# Patient Record
Sex: FEMALE | Race: WHITE
[De-identification: names, ages, dates, MRNs, and addresses within clinical notes are randomized per-mention and may not be internally consistent; named-entity substitution may affect disease eponyms.]

---

## 2020-11-10 ENCOUNTER — TRANSCRIPTION ENCOUNTER (OUTPATIENT)
Age: 30
End: 2020-11-10

## 2021-03-09 ENCOUNTER — TRANSCRIPTION ENCOUNTER (OUTPATIENT)
Age: 31
End: 2021-03-09

## 2021-04-15 ENCOUNTER — TRANSCRIPTION ENCOUNTER (OUTPATIENT)
Age: 31
End: 2021-04-15

## 2021-05-19 ENCOUNTER — TRANSCRIPTION ENCOUNTER (OUTPATIENT)
Age: 31
End: 2021-05-19

## 2021-08-16 ENCOUNTER — APPOINTMENT (OUTPATIENT)
Dept: PLASTIC SURGERY | Facility: CLINIC | Age: 31
End: 2021-08-16
Payer: SELF-PAY

## 2021-08-16 PROCEDURE — 99024 POSTOP FOLLOW-UP VISIT: CPT

## 2021-10-14 ENCOUNTER — TRANSCRIPTION ENCOUNTER (OUTPATIENT)
Age: 31
End: 2021-10-14

## 2021-11-30 NOTE — PHYSICAL EXAM
[NI] : Normal [de-identified] : Normal anatomy to upper and lower lip.  [de-identified] : Skin clear, no lesions, no cellulitis.

## 2021-11-30 NOTE — ASSESSMENT
[FreeTextEntry1] : Patient is a 30 y/o F, otherwise healthy presenting to clinic for improvement in lip size and contour. She desires a mckeon natural appearing top and lower lip. Discussed risks/benefits of filler injections with patient including pain, soreness at injection site, local reaction, asymmetry, and swelling. Patient understanding and consent for filler injection obtained. \par \par \par - Patient was given bilateral infraorbital and mental nerve block with 5cc 1% lidocaine with epi. \par - Area cleansed with alcohol prep \par - 1 and a half syringes of 0.55ml of Volbella XC administered to superior and inferior lips, tolerated well \par - Ice pack applied \par \par LOT: [id number]\par EXP: 2021-08-27\par \par Explained to patient potential for increased swelling and ecchymosis over next several days, apply ice pack, head elevation and over the counter pain medicine for soreness. Patient may contact clinic with any questions or concerns.

## 2021-11-30 NOTE — HISTORY OF PRESENT ILLNESS
[FreeTextEntry1] : Patient is a 32 y/o otherwise healthy female with no past medical history presenting to clinic requesting mckeon lips. She wound like to have filler to both her top and bottom lips. She has never had injections previously. Denies any medical history of history of allergies.

## 2021-12-01 ENCOUNTER — TRANSCRIPTION ENCOUNTER (OUTPATIENT)
Age: 31
End: 2021-12-01

## 2025-04-20 ENCOUNTER — INPATIENT (INPATIENT)
Facility: HOSPITAL | Age: 35
LOS: 0 days | Discharge: LEFT BEFORE DISCHARGE | DRG: 316 | End: 2025-04-21
Attending: INTERNAL MEDICINE | Admitting: INTERNAL MEDICINE
Payer: COMMERCIAL

## 2025-04-20 VITALS
DIASTOLIC BLOOD PRESSURE: 49 MMHG | OXYGEN SATURATION: 99 % | RESPIRATION RATE: 16 BRPM | WEIGHT: 220.02 LBS | SYSTOLIC BLOOD PRESSURE: 80 MMHG | HEIGHT: 62 IN | HEART RATE: 79 BPM | TEMPERATURE: 98 F

## 2025-04-20 DIAGNOSIS — I95.9 HYPOTENSION, UNSPECIFIED: ICD-10-CM

## 2025-04-20 LAB
ALBUMIN SERPL ELPH-MCNC: 2.9 G/DL — LOW (ref 3.5–5)
ALP SERPL-CCNC: 86 U/L — SIGNIFICANT CHANGE UP (ref 40–120)
ALT FLD-CCNC: 12 U/L DA — SIGNIFICANT CHANGE UP (ref 10–60)
ANION GAP SERPL CALC-SCNC: 3 MMOL/L — LOW (ref 5–17)
AST SERPL-CCNC: 14 U/L — SIGNIFICANT CHANGE UP (ref 10–40)
BASOPHILS # BLD AUTO: 0.03 K/UL — SIGNIFICANT CHANGE UP (ref 0–0.2)
BASOPHILS NFR BLD AUTO: 0.6 % — SIGNIFICANT CHANGE UP (ref 0–2)
BILIRUB SERPL-MCNC: 0.1 MG/DL — LOW (ref 0.2–1.2)
BLD GP AB SCN SERPL QL: SIGNIFICANT CHANGE UP
BUN SERPL-MCNC: 9 MG/DL — SIGNIFICANT CHANGE UP (ref 7–18)
CALCIUM SERPL-MCNC: 8.4 MG/DL — SIGNIFICANT CHANGE UP (ref 8.4–10.5)
CHLORIDE SERPL-SCNC: 110 MMOL/L — HIGH (ref 96–108)
CK SERPL-CCNC: 78 U/L — SIGNIFICANT CHANGE UP (ref 21–215)
CO2 SERPL-SCNC: 28 MMOL/L — SIGNIFICANT CHANGE UP (ref 22–31)
CREAT SERPL-MCNC: 0.68 MG/DL — SIGNIFICANT CHANGE UP (ref 0.5–1.3)
EGFR: 116 ML/MIN/1.73M2 — SIGNIFICANT CHANGE UP
EGFR: 116 ML/MIN/1.73M2 — SIGNIFICANT CHANGE UP
EOSINOPHIL # BLD AUTO: 0.23 K/UL — SIGNIFICANT CHANGE UP (ref 0–0.5)
EOSINOPHIL NFR BLD AUTO: 4.2 % — SIGNIFICANT CHANGE UP (ref 0–6)
GLUCOSE SERPL-MCNC: 102 MG/DL — HIGH (ref 70–99)
HCG SERPL-ACNC: <1 MIU/ML — SIGNIFICANT CHANGE UP
HCT VFR BLD CALC: 28.7 % — LOW (ref 34.5–45)
HCT VFR BLD CALC: 28.9 % — LOW (ref 34.5–45)
HGB BLD-MCNC: 9.4 G/DL — LOW (ref 11.5–15.5)
HGB BLD-MCNC: 9.5 G/DL — LOW (ref 11.5–15.5)
IMM GRANULOCYTES NFR BLD AUTO: 0.2 % — SIGNIFICANT CHANGE UP (ref 0–0.9)
LIDOCAIN IGE QN: 66 U/L — SIGNIFICANT CHANGE UP (ref 13–75)
LYMPHOCYTES # BLD AUTO: 1.49 K/UL — SIGNIFICANT CHANGE UP (ref 1–3.3)
LYMPHOCYTES # BLD AUTO: 27.5 % — SIGNIFICANT CHANGE UP (ref 13–44)
MCHC RBC-ENTMCNC: 27.1 PG — SIGNIFICANT CHANGE UP (ref 27–34)
MCHC RBC-ENTMCNC: 27.2 PG — SIGNIFICANT CHANGE UP (ref 27–34)
MCHC RBC-ENTMCNC: 32.5 G/DL — SIGNIFICANT CHANGE UP (ref 32–36)
MCHC RBC-ENTMCNC: 33.1 G/DL — SIGNIFICANT CHANGE UP (ref 32–36)
MCV RBC AUTO: 82.2 FL — SIGNIFICANT CHANGE UP (ref 80–100)
MCV RBC AUTO: 83.3 FL — SIGNIFICANT CHANGE UP (ref 80–100)
MONOCYTES # BLD AUTO: 0.51 K/UL — SIGNIFICANT CHANGE UP (ref 0–0.9)
MONOCYTES NFR BLD AUTO: 9.4 % — SIGNIFICANT CHANGE UP (ref 2–14)
NEUTROPHILS # BLD AUTO: 3.15 K/UL — SIGNIFICANT CHANGE UP (ref 1.8–7.4)
NEUTROPHILS NFR BLD AUTO: 58.1 % — SIGNIFICANT CHANGE UP (ref 43–77)
NRBC BLD AUTO-RTO: 0 /100 WBCS — SIGNIFICANT CHANGE UP (ref 0–0)
NRBC BLD AUTO-RTO: 0 /100 WBCS — SIGNIFICANT CHANGE UP (ref 0–0)
PLATELET # BLD AUTO: 204 K/UL — SIGNIFICANT CHANGE UP (ref 150–400)
PLATELET # BLD AUTO: 213 K/UL — SIGNIFICANT CHANGE UP (ref 150–400)
POTASSIUM SERPL-MCNC: 3.8 MMOL/L — SIGNIFICANT CHANGE UP (ref 3.5–5.3)
POTASSIUM SERPL-SCNC: 3.8 MMOL/L — SIGNIFICANT CHANGE UP (ref 3.5–5.3)
PROT SERPL-MCNC: 6 G/DL — SIGNIFICANT CHANGE UP (ref 6–8.3)
RBC # BLD: 3.47 M/UL — LOW (ref 3.8–5.2)
RBC # BLD: 3.49 M/UL — LOW (ref 3.8–5.2)
RBC # FLD: 15.6 % — HIGH (ref 10.3–14.5)
RBC # FLD: 15.7 % — HIGH (ref 10.3–14.5)
SODIUM SERPL-SCNC: 141 MMOL/L — SIGNIFICANT CHANGE UP (ref 135–145)
TROPONIN I, HIGH SENSITIVITY RESULT: 3.7 NG/L — SIGNIFICANT CHANGE UP
WBC # BLD: 5.42 K/UL — SIGNIFICANT CHANGE UP (ref 3.8–10.5)
WBC # BLD: 6.36 K/UL — SIGNIFICANT CHANGE UP (ref 3.8–10.5)
WBC # FLD AUTO: 5.42 K/UL — SIGNIFICANT CHANGE UP (ref 3.8–10.5)
WBC # FLD AUTO: 6.36 K/UL — SIGNIFICANT CHANGE UP (ref 3.8–10.5)

## 2025-04-20 PROCEDURE — 93010 ELECTROCARDIOGRAM REPORT: CPT

## 2025-04-20 PROCEDURE — 99291 CRITICAL CARE FIRST HOUR: CPT

## 2025-04-20 RX ADMIN — Medication 1000 MILLILITER(S): at 21:10

## 2025-04-20 RX ADMIN — Medication 100 MILLILITER(S): at 23:02

## 2025-04-20 RX ADMIN — Medication 1000 MILLILITER(S): at 18:10

## 2025-04-20 RX ADMIN — Medication 2000 MILLILITER(S): at 14:39

## 2025-04-20 RX ADMIN — Medication 2000 MILLILITER(S): at 16:00

## 2025-04-20 NOTE — ED ADULT NURSE REASSESSMENT NOTE - NS ED NURSE REASSESS COMMENT FT1
Pt reports not feeling well. Noted to be joe cardiac on monitor. Pt placed in Trendelenburg position. MD Reyes made aware.

## 2025-04-20 NOTE — ED ADULT NURSE NOTE - OBJECTIVE STATEMENT
Assumed pt care for a 35 yr old female complaining of a near syncopal event. Pt was a visitor at the hospital and reports not feeing well while visiting a patient. Rapid was called pt transported to ED. Pt denies any fever chills, chest pain, dyspnea, N/V/D. Pt noted to be pale and teresa with clammy skin. MD made aware. Reports heavy menstrual cycle this month denies pregnancy.  IV established. Labs collected. meds given as per order. Pt placed on cardiac monitor.

## 2025-04-20 NOTE — ED PROVIDER NOTE - OBJECTIVE STATEMENT
35-year-old female with history of uterine fibroid for embolization on 5/2, depression/anxiety, ADHD, anemia on iron every day.  Patient with menstruation 2 days ago, uses ultra tampon, change 2 per hour with clots.  Complaining of feeling lightheaded and increased fatigue since this morning.  She came to visit her grandmother in ICU.  While sitting on the chair, suddenly felt lightheaded, sweaty, had a near syncope, rapid response was called and for patient to ED.  Patient denies vomiting, CP, palpitation.  She had similar episode 2 years ago.  Patient never had blood transfusion

## 2025-04-20 NOTE — ED ADULT TRIAGE NOTE - CHIEF COMPLAINT QUOTE
dizziness and almost passed out tody while visiting family member in the hospital, Slid of the chair on the floor, states having heavy menstruation on her 3rd day. Denies any dizziness now.

## 2025-04-20 NOTE — ED PROVIDER NOTE - PROGRESS NOTE DETAILS
Labs/EKG explained to both patient and mother   patient with orthostatic hypotension despite receiving 2 L of normal saline   repeat CBC, no changes with hemoglobin. RN noted patient's heart rate dropped to 36 upon standing up    Concern for autonomic dysfunction, Unlikely sepsis, patient with no fever, leukocytosis,  will admit patient   Case discussed with Dr. Denise

## 2025-04-20 NOTE — ED PROVIDER NOTE - CARE PLAN
Principal Discharge DX:	Hypotension   1 Principal Discharge DX:	Hypotension  Secondary Diagnosis:	Autonomic dysfunction

## 2025-04-20 NOTE — ED PROVIDER NOTE - EKG #1 DATE/TIME
HYDROcodone-acetaminophen (Norco)  MG per tablet 45 tablet 0 12/15/2023 --    Sig: Take 1 to 2 tablets daily x 30 days qty 45.    Sent to pharmacy as: HYDROcodone-Acetaminophen  MG Oral Tablet    Class: Eprescribe      [facility] - [place], [place] - [address] [place] [address] [[id number]]   
Refill request [facility]  #[phone]  
20-Apr-2025 14:07

## 2025-04-21 ENCOUNTER — RESULT REVIEW (OUTPATIENT)
Age: 35
End: 2025-04-21

## 2025-04-21 ENCOUNTER — TRANSCRIPTION ENCOUNTER (OUTPATIENT)
Age: 35
End: 2025-04-21

## 2025-04-21 VITALS — OXYGEN SATURATION: 98 % | HEART RATE: 72 BPM | TEMPERATURE: 98 F | RESPIRATION RATE: 18 BRPM

## 2025-04-21 DIAGNOSIS — D25.9 LEIOMYOMA OF UTERUS, UNSPECIFIED: ICD-10-CM

## 2025-04-21 DIAGNOSIS — D50.9 IRON DEFICIENCY ANEMIA, UNSPECIFIED: ICD-10-CM

## 2025-04-21 DIAGNOSIS — Z29.9 ENCOUNTER FOR PROPHYLACTIC MEASURES, UNSPECIFIED: ICD-10-CM

## 2025-04-21 DIAGNOSIS — R55 SYNCOPE AND COLLAPSE: ICD-10-CM

## 2025-04-21 DIAGNOSIS — F41.8 OTHER SPECIFIED ANXIETY DISORDERS: ICD-10-CM

## 2025-04-21 LAB
ALBUMIN SERPL ELPH-MCNC: 2.7 G/DL — LOW (ref 3.5–5)
ALP SERPL-CCNC: 72 U/L — SIGNIFICANT CHANGE UP (ref 40–120)
ALT FLD-CCNC: 12 U/L DA — SIGNIFICANT CHANGE UP (ref 10–60)
ANION GAP SERPL CALC-SCNC: 6 MMOL/L — SIGNIFICANT CHANGE UP (ref 5–17)
AST SERPL-CCNC: 18 U/L — SIGNIFICANT CHANGE UP (ref 10–40)
BASOPHILS # BLD AUTO: 0.03 K/UL — SIGNIFICANT CHANGE UP (ref 0–0.2)
BASOPHILS NFR BLD AUTO: 0.5 % — SIGNIFICANT CHANGE UP (ref 0–2)
BILIRUB SERPL-MCNC: 0.3 MG/DL — SIGNIFICANT CHANGE UP (ref 0.2–1.2)
BUN SERPL-MCNC: 9 MG/DL — SIGNIFICANT CHANGE UP (ref 7–18)
CALCIUM SERPL-MCNC: 8.6 MG/DL — SIGNIFICANT CHANGE UP (ref 8.4–10.5)
CHLORIDE SERPL-SCNC: 110 MMOL/L — HIGH (ref 96–108)
CHOLEST SERPL-MCNC: 202 MG/DL — HIGH
CO2 SERPL-SCNC: 25 MMOL/L — SIGNIFICANT CHANGE UP (ref 22–31)
CREAT SERPL-MCNC: 0.52 MG/DL — SIGNIFICANT CHANGE UP (ref 0.5–1.3)
EGFR: 123 ML/MIN/1.73M2 — SIGNIFICANT CHANGE UP
EGFR: 123 ML/MIN/1.73M2 — SIGNIFICANT CHANGE UP
EOSINOPHIL # BLD AUTO: 0.26 K/UL — SIGNIFICANT CHANGE UP (ref 0–0.5)
EOSINOPHIL NFR BLD AUTO: 3.9 % — SIGNIFICANT CHANGE UP (ref 0–6)
GLUCOSE SERPL-MCNC: 98 MG/DL — SIGNIFICANT CHANGE UP (ref 70–99)
HCT VFR BLD CALC: 29.2 % — LOW (ref 34.5–45)
HDLC SERPL-MCNC: 62 MG/DL — SIGNIFICANT CHANGE UP
HGB BLD-MCNC: 9.7 G/DL — LOW (ref 11.5–15.5)
IMM GRANULOCYTES NFR BLD AUTO: 0.5 % — SIGNIFICANT CHANGE UP (ref 0–0.9)
LDLC SERPL-MCNC: 130 MG/DL — HIGH
LIPID PNL WITH DIRECT LDL SERPL: 130 MG/DL — HIGH
LYMPHOCYTES # BLD AUTO: 1.55 K/UL — SIGNIFICANT CHANGE UP (ref 1–3.3)
LYMPHOCYTES # BLD AUTO: 23.4 % — SIGNIFICANT CHANGE UP (ref 13–44)
MAGNESIUM SERPL-MCNC: 2 MG/DL — SIGNIFICANT CHANGE UP (ref 1.6–2.6)
MCHC RBC-ENTMCNC: 27.2 PG — SIGNIFICANT CHANGE UP (ref 27–34)
MCHC RBC-ENTMCNC: 33.2 G/DL — SIGNIFICANT CHANGE UP (ref 32–36)
MCV RBC AUTO: 81.8 FL — SIGNIFICANT CHANGE UP (ref 80–100)
MONOCYTES # BLD AUTO: 0.44 K/UL — SIGNIFICANT CHANGE UP (ref 0–0.9)
MONOCYTES NFR BLD AUTO: 6.6 % — SIGNIFICANT CHANGE UP (ref 2–14)
NEUTROPHILS # BLD AUTO: 4.31 K/UL — SIGNIFICANT CHANGE UP (ref 1.8–7.4)
NEUTROPHILS NFR BLD AUTO: 65.1 % — SIGNIFICANT CHANGE UP (ref 43–77)
NONHDLC SERPL-MCNC: 140 MG/DL — HIGH
NRBC BLD AUTO-RTO: 0 /100 WBCS — SIGNIFICANT CHANGE UP (ref 0–0)
PHOSPHATE SERPL-MCNC: 3.2 MG/DL — SIGNIFICANT CHANGE UP (ref 2.5–4.5)
PLATELET # BLD AUTO: 210 K/UL — SIGNIFICANT CHANGE UP (ref 150–400)
POTASSIUM SERPL-MCNC: 3.8 MMOL/L — SIGNIFICANT CHANGE UP (ref 3.5–5.3)
POTASSIUM SERPL-SCNC: 3.8 MMOL/L — SIGNIFICANT CHANGE UP (ref 3.5–5.3)
PROT SERPL-MCNC: 5.9 G/DL — LOW (ref 6–8.3)
RBC # BLD: 3.57 M/UL — LOW (ref 3.8–5.2)
RBC # FLD: 15.6 % — HIGH (ref 10.3–14.5)
SODIUM SERPL-SCNC: 141 MMOL/L — SIGNIFICANT CHANGE UP (ref 135–145)
TRIGL SERPL-MCNC: 56 MG/DL — SIGNIFICANT CHANGE UP
TSH SERPL-MCNC: 2.16 UU/ML — SIGNIFICANT CHANGE UP (ref 0.34–4.82)
WBC # BLD: 6.62 K/UL — SIGNIFICANT CHANGE UP (ref 3.8–10.5)
WBC # FLD AUTO: 6.62 K/UL — SIGNIFICANT CHANGE UP (ref 3.8–10.5)

## 2025-04-21 PROCEDURE — 84702 CHORIONIC GONADOTROPIN TEST: CPT

## 2025-04-21 PROCEDURE — 84100 ASSAY OF PHOSPHORUS: CPT

## 2025-04-21 PROCEDURE — 93306 TTE W/DOPPLER COMPLETE: CPT | Mod: 26

## 2025-04-21 PROCEDURE — 80061 LIPID PANEL: CPT

## 2025-04-21 PROCEDURE — 84484 ASSAY OF TROPONIN QUANT: CPT

## 2025-04-21 PROCEDURE — 82550 ASSAY OF CK (CPK): CPT

## 2025-04-21 PROCEDURE — 83690 ASSAY OF LIPASE: CPT

## 2025-04-21 PROCEDURE — 85025 COMPLETE CBC W/AUTO DIFF WBC: CPT

## 2025-04-21 PROCEDURE — 96361 HYDRATE IV INFUSION ADD-ON: CPT

## 2025-04-21 PROCEDURE — 84443 ASSAY THYROID STIM HORMONE: CPT

## 2025-04-21 PROCEDURE — 93306 TTE W/DOPPLER COMPLETE: CPT

## 2025-04-21 PROCEDURE — 99291 CRITICAL CARE FIRST HOUR: CPT

## 2025-04-21 PROCEDURE — 93005 ELECTROCARDIOGRAM TRACING: CPT

## 2025-04-21 PROCEDURE — 82962 GLUCOSE BLOOD TEST: CPT

## 2025-04-21 PROCEDURE — 96360 HYDRATION IV INFUSION INIT: CPT

## 2025-04-21 PROCEDURE — 86901 BLOOD TYPING SEROLOGIC RH(D): CPT

## 2025-04-21 PROCEDURE — 80053 COMPREHEN METABOLIC PANEL: CPT

## 2025-04-21 PROCEDURE — 85027 COMPLETE CBC AUTOMATED: CPT

## 2025-04-21 PROCEDURE — 86900 BLOOD TYPING SEROLOGIC ABO: CPT

## 2025-04-21 PROCEDURE — 71045 X-RAY EXAM CHEST 1 VIEW: CPT

## 2025-04-21 PROCEDURE — 36415 COLL VENOUS BLD VENIPUNCTURE: CPT

## 2025-04-21 PROCEDURE — 86850 RBC ANTIBODY SCREEN: CPT

## 2025-04-21 PROCEDURE — 99222 1ST HOSP IP/OBS MODERATE 55: CPT

## 2025-04-21 PROCEDURE — 83036 HEMOGLOBIN GLYCOSYLATED A1C: CPT

## 2025-04-21 PROCEDURE — 83735 ASSAY OF MAGNESIUM: CPT

## 2025-04-21 RX ORDER — ATOMOXETINE 10 MG/1
1 CAPSULE ORAL
Refills: 0 | DISCHARGE

## 2025-04-21 RX ORDER — SERTRALINE 100 MG/1
100 TABLET, FILM COATED ORAL DAILY
Refills: 0 | Status: DISCONTINUED | OUTPATIENT
Start: 2025-04-21 | End: 2025-04-21

## 2025-04-21 RX ORDER — ENOXAPARIN SODIUM 100 MG/ML
40 INJECTION SUBCUTANEOUS EVERY 24 HOURS
Refills: 0 | Status: DISCONTINUED | OUTPATIENT
Start: 2025-04-21 | End: 2025-04-21

## 2025-04-21 RX ORDER — FERROUS SULFATE 137(45) MG
325 TABLET, EXTENDED RELEASE ORAL DAILY
Refills: 0 | Status: DISCONTINUED | OUTPATIENT
Start: 2025-04-21 | End: 2025-04-21

## 2025-04-21 RX ORDER — SERTRALINE 100 MG/1
150 TABLET, FILM COATED ORAL DAILY
Refills: 0 | Status: DISCONTINUED | OUTPATIENT
Start: 2025-04-21 | End: 2025-04-21

## 2025-04-21 RX ORDER — SERTRALINE 100 MG/1
1.5 TABLET, FILM COATED ORAL
Refills: 0 | DISCHARGE

## 2025-04-21 RX ORDER — SPIRONOLACTONE 25 MG
1 TABLET ORAL
Refills: 0 | DISCHARGE

## 2025-04-21 RX ORDER — FERROUS SULFATE 137(45) MG
1 TABLET, EXTENDED RELEASE ORAL
Refills: 0 | DISCHARGE

## 2025-04-21 RX ADMIN — Medication 100 MILLILITER(S): at 01:37

## 2025-04-21 RX ADMIN — ENOXAPARIN SODIUM 40 MILLIGRAM(S): 100 INJECTION SUBCUTANEOUS at 06:41

## 2025-04-21 NOTE — DISCHARGE NOTE PROVIDER - NSDCCAREPROVSEEN_GEN_ALL_CORE_FT
Miah, Danie Cui, Molly Durham, Vern Flores, Alison Laws, Sabina Mckeon, Berkley Bland, Maribel Jasso, Dimple Denise, Ramon Wang, Mac Taylor

## 2025-04-21 NOTE — DISCHARGE NOTE PROVIDER - NSDCCPCAREPLAN_GEN_ALL_CORE_FT
PRINCIPAL DISCHARGE DIAGNOSIS  Diagnosis: Hypotension  Assessment and Plan of Treatment: You came to the hospital after passing out. Your blood pressure was low with systolic in the 80s, HR low at 36 BPM on admission. Your EKG was normal. Your orthostatic vitals were positive ; you were given fluids with improvement. Heart echo (ultrasound) showed _____. Chest X ray normal. Neurology consulted; you elected on to have CT scan. Your syncope episode was multifactorial including orthostatic hypotension, mild anemia, and bradycardia. Please follow up with neurology and cardiology within 1 week.      SECONDARY DISCHARGE DIAGNOSES  Diagnosis: Uterine fibroid  Assessment and Plan of Treatment: You have history of uterine fibroids with menorrhagia due for uterine embolization on 5/2. Your Hgb 9.4 on admission. Please continue your iron tablet for iron deficiency anemia. Please follow up with your PCP within 1 week.    Diagnosis: Iron deficiency anemia  Assessment and Plan of Treatment: Your Hgb 9.4 on admission. Please continue your iron tablet for iron deficiency anemia. Please follow up with your PCP within 1 week.    Diagnosis: Anxiety with depression  Assessment and Plan of Treatment: Please continue your same home medication sertraline.    Diagnosis: ADHD  Assessment and Plan of Treatment: Please continue your same home medication atomexetine.     PRINCIPAL DISCHARGE DIAGNOSIS  Diagnosis: Hypotension  Assessment and Plan of Treatment: You came to the hospital after passing out. Your blood pressure was low with systolic in the 80s, HR low at 36 BPM on admission. Your EKG was normal. Your orthostatic vitals were positive ; you were given fluids with improvement. Heart echo (ultrasound) was performed. Chest X ray normal. Neurology consulted; you elected on to have CT scan. Your syncope episode was multifactorial including orthostatic hypotension, mild anemia, and bradycardia. Please follow up with neurology and cardiology within 1 week.  After long discussion, you requested to leave hospital against medical advice. You understand the risks of mortality and morbidity from leaving against medical advice. You were explained your current medical condition, risks and consequences of leaving without finishing treatment and workup. You were instructed to return to ED if you do not feel well and that you are welcome to return for care any time.      SECONDARY DISCHARGE DIAGNOSES  Diagnosis: Uterine fibroid  Assessment and Plan of Treatment: You have history of uterine fibroids with menorrhagia due for uterine embolization on 5/2. Your Hgb 9.4 on admission. Please continue your iron tablet for iron deficiency anemia. Please follow up with your PCP within 1 week.    Diagnosis: Iron deficiency anemia  Assessment and Plan of Treatment: Your Hgb 9.4 on admission. Please continue your iron tablet for iron deficiency anemia. Please follow up with your PCP within 1 week.    Diagnosis: Anxiety with depression  Assessment and Plan of Treatment: Please continue your same home medication sertraline.    Diagnosis: ADHD  Assessment and Plan of Treatment: Please continue your same home medication atomexetine.

## 2025-04-21 NOTE — DISCHARGE NOTE NURSING/CASE MANAGEMENT/SOCIAL WORK - PATIENT PORTAL LINK FT
You can access the FollowMyHealth Patient Portal offered by [facility] by registering at the following website: [email or web address]. By joining [facility]’s FollowMyHealth portal, you will also be able to view your health information using other applications (apps) compatible with our system.

## 2025-04-21 NOTE — H&P ADULT - NSHPPHYSICALEXAM_GEN_ALL_CORE
GENERAL: NAD; lying in bed  HEAD:  Atraumatic, Normocephalic  EYES: EOMI, PERRLA, conjunctiva and sclera clear  ENMT: Dry mucous membranes  NECK: Supple, normal appearance,   NERVOUS SYSTEM:  Alert & Oriented X3,    CHEST/LUNG: Lungs clear to auscultation bilaterally, No rales, rhonchi, wheezing   HEART: Regular rate and rhythm; No murmurs, rubs, or gallops  ABDOMEN: Soft, Nontender, Nondistended; Bowel sounds present  EXTREMITIES:  2+ Peripheral Pulses, No edema  SKIN: No rashes or lesions;

## 2025-04-21 NOTE — PROGRESS NOTE ADULT - PROBLEM SELECTOR PLAN 2
h/o uterine fibroids with menorrhagia due for uterine a. embolization on 5/2  unknown baseline Hb, on adm 9.4  continue to monitor   transfuse if <7

## 2025-04-21 NOTE — PROGRESS NOTE ADULT - PROBLEM SELECTOR PLAN 1
presented after episode of dizziness in the ICU, and syncope in the ED in the setting of stressful environment, with prodromal symptoms  in the ED hypotensive to SBP 80s, bradycardic episode to 36  EKG showing NSR 71 BPM, Trop negx1, CXR neg  DBP positive for orthostats on sitting to standing  s/p 3L IVF NS  most likely vasovagal syncope vs orthostatic hypotension  admit to telemetry  c/w IVF 100ml/hr  f/u rpt orthostatic vitals in the AM  f/u TTE  Consult Cardiology and Neurology in the AM CONSTITUTIONAL: denies fever, chills, fatigue, weakness  HEENT: denies blurred visions, sore throat  SKIN: denies new lesions, rash  CARDIOVASCULAR: denies chest pain, chest pressure, palpitations  RESPIRATORY: denies shortness of breath, sputum production  GASTROINTESTINAL: denies nausea, vomiting, diarrhea, abdominal pain  GENITOURINARY: denies dysuria, discharge  NEUROLOGICAL: denies numbness, headache, focal weakness  MUSCULOSKELETAL: denies new joint pain, muscle aches  HEMATOLOGIC: denies gross bleeding, bruising  LYMPHATICS: denies enlarged lymph nodes, extremity swelling  PSYCHIATRIC: denies recent changes in anxiety, depression  ENDOCRINOLOGIC: denies sweating, cold or heat intolerance presented after episode of dizziness in the ICU, and syncope in the ED in the setting of stressful environment, with prodromal symptoms  in the ED hypotensive to SBP 80s, bradycardic episode to 36  EKG showing NSR 71 BPM, Trop negx1, CXR neg  DBP positive for orthostats on sitting to standing  s/p 3L IVF NS  most likely vasovagal syncope vs orthostatic hypotension  admit to telemetry  c/w IVF 100ml/hr  f/u orthostatics repeat  f/u TTE  f/u CTH and CT cervical spine  Cardiology consulted Dr Bartlett  Neuro consulted Dr Dooley CONSTITUTIONAL: admits to fever, chills, fatigue, weakness  HEENT: denies blurred visions, sore throat  SKIN: denies new lesions, rash  CARDIOVASCULAR: admits to pleuritic chest pain, chest pressure, denies palpitations  RESPIRATORY: admits to pleuritic chest pain, shortness of breath, denies sputum production  GASTROINTESTINAL: admits to nausea, diarrhea and decreased PO intake, denies vomiting  GENITOURINARY: denies dysuria, discharge  NEUROLOGICAL: denies numbness, headache, focal weakness  MUSCULOSKELETAL: admits to muscle aches  HEMATOLOGIC: denies gross bleeding, bruising  LYMPHATICS: denies enlarged lymph nodes, extremity swelling  PSYCHIATRIC: denies recent changes in anxiety, depression

## 2025-04-21 NOTE — H&P ADULT - NSHPREVIEWOFSYSTEMS_GEN_ALL_CORE
CONSTITUTIONAL: No fever, weight loss, or fatigue  RESPIRATORY: No cough, wheezing, chills or hemoptysis; No shortness of breath  CARDIOVASCULAR: No chest pain, palpitations, dizziness, or leg swelling  GASTROINTESTINAL: No abdominal pain. No nausea, vomiting, or hematemesis; No diarrhea or constipation. No melena or hematochezia.  GENITOURINARY: No dysuria or hematuria, urinary frequency  NEUROLOGICAL: No headaches, memory loss, loss of strength, numbness, or tremors  ENDOCRINE: No polyuria, polydipsia, or heat/cold intolerance  MUSKULOSKELETAL: No muscle aches, joint pains  HEME: no easy bruisability, no tender or enlarged lymph nodes  SKIN: No itching, burning, rashes, or lesions . CONSTITUTIONAL: No fever, weight loss, or fatigue  RESPIRATORY: No cough, wheezing, chills or hemoptysis; No shortness of breath  CARDIOVASCULAR: No chest pain, palpitations, +dizziness, or leg swelling  GASTROINTESTINAL: No abdominal pain. No nausea, vomiting, or hematemesis; No diarrhea or constipation. No melena or hematochezia.  GENITOURINARY: No dysuria or hematuria, urinary frequency  NEUROLOGICAL: No headaches, memory loss, loss of strength, numbness, or tremors  ENDOCRINE: No polyuria, polydipsia, or heat/cold intolerance  MUSKULOSKELETAL: No muscle aches, joint pains  HEME: no easy bruisability, no tender or enlarged lymph nodes  SKIN: No itching, burning, rashes, or lesions .

## 2025-04-21 NOTE — CONSULT NOTE ADULT - SUBJECTIVE AND OBJECTIVE BOX
Neurology Consultation     HPI:  Ms. Larry is a 35 year-old female, with PMH of Anxiety/Depression, ADHD, Uterine Fibroids causing menorrhagia (scheduled for Uterine Artery embolization on 5/2, KYLIE, HLD (not on any PO meds). She had a syncopal event while visiting family in ICU. Had prodrome of spreading warmth, dizziness, then passed out very briefly. No abnormal movements. Promptly returned to baseline and with no post ictal period. Of note patient is anemic likely due to uterine fibroid causing menorrhagia, but also has chart diagnosis of KYLIE. In ED patient had SBP in 80s and HR as low as 36. Also was positive orthostatics.        PAST MEDICAL & SURGICAL HISTORY:  Anxiety with depression      ADHD      Uterine fibroid      Iron deficiency anemia      S/P appendectomy        FAMILY HISTORY:  Family history of breast cancer in mother (Mother)      SOCIAL HISTORY: no smoking, alcohol, drug use hx    MEDICATIONS (HOME):  Home Medications:  atomoxetine 100 mg oral capsule: 1 cap(s) orally once a day (21 Apr 2025 10:44)  ferrous sulfate 325 mg (65 mg elemental iron) oral tablet: 1 tab(s) orally once a day (21 Apr 2025 10:44)  Pepcid 20 mg oral tablet: 1 tab(s) orally once a day (21 Apr 2025 10:44)  sertraline 100 mg oral tablet: 1.5 tab(s) orally once a day (21 Apr 2025 11:31)  spironolactone 100 mg oral tablet: 1 tab(s) orally once a day (21 Apr 2025 10:44)    MEDICATIONS  (STANDING):  enoxaparin Injectable 40 milliGRAM(s) SubCutaneous every 24 hours  famotidine    Tablet 20 milliGRAM(s) Oral daily  ferrous    sulfate 325 milliGRAM(s) Oral daily  sertraline 150 milliGRAM(s) Oral daily  sodium chloride 0.9%. 1000 milliLiter(s) (100 mL/Hr) IV Continuous <Continuous>    MEDICATIONS  (PRN):    ALLERGIES/INTOLERANCES:  Allergies  No Known Allergies    Intolerances    VITALS & EXAMINATION:  Vital Signs Last 24 Hrs  T(C): 36.7 (21 Apr 2025 10:49), Max: 36.7 (20 Apr 2025 19:45)  T(F): 98 (21 Apr 2025 10:49), Max: 98.1 (20 Apr 2025 19:45)  HR: 72 (21 Apr 2025 10:49) (36 - 90)  BP: 107/73 (21 Apr 2025 07:34) (84/49 - 119/74)  BP(mean): --  RR: 18 (21 Apr 2025 10:49) (16 - 18)  SpO2: 98% (21 Apr 2025 10:49) (97% - 100%)    Parameters below as of 21 Apr 2025 10:49  Patient On (Oxygen Delivery Method): room air        General:  Constitutional: Female, appears stated age, nontoxic, not in distress,    Head: Normocephalic;   Eyes: clear sclera;   Extremities: No cyanosis;   Resp: breathing comfortably  Neck: no nuchal rigidity     Neurological (>12):  MS: Awake, alert.  Oriented person place situation year month. Follows simple complex cross commands. Able to ID 3/3 objects. Attends to examiner  Language: Speech is hypophonic, clear, fluent, good repetition,  comprehension, registration of words.  CNs: PERRL (R 3mm, L 3mm). VFF. EOMI. No disconjugate gaze, nystagmus. V1-3 intact LT, No facial asymmetry b/l. Hearing grossly normal b/l. Tongue midline and can move side to side.     Motor - Normal bulk and tone throughout. No pronator drift.    L/R (out of 5 each)       Deltoid  5/5    Biceps   5/5      Triceps  5/5         Wrist Extension 5/5   Wrist Flexion  5/5   Interossei 5/5     5/5   L/R (out of 5 each)       Hip Flexion  5/5    Hip Extension  5/5  Knee Extension  5/5  Dorsiflexion  5/5      Plantar Flexion 5/5     Sensation: Intact to LT b/l x4 extremities. Cortical: Extinction on DSS (neglect): none  Reflexes L/R:  Biceps(C5) 2/2  BR(C6) 2/2   Triceps(C7)  2/2 Patellar(L4)   2/2   Ankles 2/2   Toes: mute b/l  no ankle clonus  Coordination: No dysmetria to FTN b/l UE  Gait: Normal Romberg. No postural instability. Normal stance. Gait baseline.    LABORATORY:  CBC                       9.7    6.62  )-----------( 210      ( 21 Apr 2025 07:29 )             29.2     Chem 04-21    141  |  110[H]  |  9   ----------------------------<  98  3.8   |  25  |  0.52    Ca    8.6      21 Apr 2025 07:29  Phos  3.2     04-21  Mg     2.0     04-21    TPro  5.9[L]  /  Alb  2.7[L]  /  TBili  0.3  /  DBili  x   /  AST  18  /  ALT  12  /  AlkPhos  72  04-21    LFTs LIVER FUNCTIONS - ( 21 Apr 2025 07:29 )  Alb: 2.7 g/dL / Pro: 5.9 g/dL / ALK PHOS: 72 U/L / ALT: 12 U/L DA / AST: 18 U/L / GGT: x           Coagulopathy   Lipid Panel 04-21 Chol 202[H] LDL -- HDL 62 Trig 56  A1c   Cardiac enzymes     U/A Urinalysis Basic - ( 21 Apr 2025 07:29 )    Color: x / Appearance: x / SG: x / pH: x  Gluc: 98 mg/dL / Ketone: x  / Bili: x / Urobili: x   Blood: x / Protein: x / Nitrite: x   Leuk Esterase: x / RBC: x / WBC x   Sq Epi: x / Non Sq Epi: x / Bacteria: x      CSF  Other    STUDIES & IMAGING: (EEG, CT, MR, U/S, TTE/CASSIE):

## 2025-04-21 NOTE — DISCHARGE NOTE PROVIDER - PROVIDER TOKENS
FREE:[LAST:[Andrea],FIRST:[Tere],PHONE:[(   )    -],FAX:[(   )    -],ADDRESS:[[phone]]],PROVIDER:[TOKEN:[[id number]]]

## 2025-04-21 NOTE — H&P ADULT - NSICDXPASTMEDICALHX_GEN_ALL_CORE_FT
PAST MEDICAL HISTORY:  ADHD     Anxiety with depression     Iron deficiency anemia     Uterine fibroid

## 2025-04-21 NOTE — H&P ADULT - ASSESSMENT
Patient is a 35 year-old female from home, with PMH of Anxiety/Depression, ADHD, Uterine Fibroids, KYLIE, HLD,  who presented to the ED after feeling faint in the ICU while visiting her grandmother. Patient states earlier she felt "hot", like she had to "poop", and felt a room-spinning sensation. Patient then noted to have a syncopal event in the ER. Patient to be admitted for syncope.    PRIMARY TEAM TO CONFIRM MED REC WITH PATIENT'S PHARMACY IN AM

## 2025-04-21 NOTE — H&P ADULT - PROBLEM SELECTOR PLAN 2
DVT PPx: h/o uterine fibroids with menorrhagia due for uterine a. embolization on 5/2  unknown baseline Hb, on adm 9.4  continue to monitor   transfuse if <7

## 2025-04-21 NOTE — CONSULT NOTE ADULT - ASSESSMENT
Ms. Larry is 35 year old woman with anemia related to fibroid who is admitted for syncope. Syncope likely multifactorial with anemia, exposure to IV placement/vasovagal, bradycardia/orthostatic hypotension contributing. Brief LOC without any post ictal state seems unlikely seizure related. Patient at baseline and no other symptoms currently. No headache. Exam is normal    No neurologic workup needed.  Patient wants to defer CT head, I agree can be deferred.

## 2025-04-21 NOTE — PROGRESS NOTE ADULT - PROBLEM SELECTOR PLAN 5
h/o ADHD on atomoxetine 100mg qd  resume home meds after medrec h/o ADHD on atomoxetine 100mg qd  atomoxetine not in pharmacy, instructed pt to bring her med

## 2025-04-21 NOTE — DISCHARGE NOTE PROVIDER - NSDCFUADDAPPT_GEN_ALL_CORE_FT
APPTS ARE READY TO BE MADE: [X] YES    Best Family or Patient Contact (if needed):    Additional Information about above appointments (if needed):    1: Neuro  2: PCP  3: Cardiology    Other comments or requests:

## 2025-04-21 NOTE — PROGRESS NOTE ADULT - SUBJECTIVE AND OBJECTIVE BOX
PGY-1 Progress Note discussed with attending      PLEASE CONTACT ON CALL TEAM:  - On Call Team (Please refer to Eliot) FROM 5:00 PM - 8:30PM  - Nightfloat Team FROM 8:30 -7:30 AM    INTERVAL HPI/OVERNIGHT EVENTS:     No acute overnight events. Pt evaluated at bedside. Pt has no new complaints.      MEDICATIONS  (STANDING):  enoxaparin Injectable 40 milliGRAM(s) SubCutaneous every 24 hours  famotidine    Tablet 20 milliGRAM(s) Oral daily  ferrous    sulfate 325 milliGRAM(s) Oral daily  sertraline 100 milliGRAM(s) Oral daily  sodium chloride 0.9%. 1000 milliLiter(s) (100 mL/Hr) IV Continuous <Continuous>    MEDICATIONS  (PRN):      Vital Signs Last 24 Hrs  T(C): 36.7 (21 Apr 2025 07:34), Max: 36.8 (20 Apr 2025 13:45)  T(F): 98 (21 Apr 2025 07:34), Max: 98.2 (20 Apr 2025 13:45)  HR: 87 (21 Apr 2025 07:34) (36 - 90)  BP: 107/73 (21 Apr 2025 07:34) (80/49 - 119/74)  BP(mean): 69 (20 Apr 2025 15:45) (69 - 69)  RR: 18 (21 Apr 2025 07:34) (16 - 23)  SpO2: 98% (21 Apr 2025 07:34) (97% - 100%)    Parameters below as of 21 Apr 2025 07:34  Patient On (Oxygen Delivery Method): room air        PHYSICAL EXAMINATION:  GENERAL: NAD  HEAD:  Atraumatic, Normocephalic  EYES:  conjunctiva and sclera clear  NECK: Supple  CHEST/LUNG: Clear to auscultation  HEART: Regular rate and rhythm; No murmurs, rubs, or gallops  ABDOMEN: Soft, Nontender, Bowel sounds present, no masses on palpation  NERVOUS SYSTEM:  Alert and oriented  EXTREMITIES:  No BLE edema  SKIN: warm, dry                          9.7    6.62  )-----------( 210      ( 21 Apr 2025 07:29 )             29.2     04-21    141  |  110[H]  |  9   ----------------------------<  98  3.8   |  25  |  0.52    Ca    8.6      21 Apr 2025 07:29  Phos  3.2     04-21  Mg     2.0     04-21    TPro  5.9[L]  /  Alb  2.7[L]  /  TBili  0.3  /  DBili  x   /  AST  18  /  ALT  12  /  AlkPhos  72  04-21    LIVER FUNCTIONS - ( 21 Apr 2025 07:29 )  Alb: 2.7 g/dL / Pro: 5.9 g/dL / ALK PHOS: 72 U/L / ALT: 12 U/L DA / AST: 18 U/L / GGT: x                   I&O's Summary    21 Apr 2025 07:01  -  21 Apr 2025 10:27  --------------------------------------------------------  IN: 200 mL / OUT: 0 mL / NET: 200 mL         PGY-1 Progress Note discussed with attending      PLEASE CONTACT ON CALL TEAM:  - On Call Team (Please refer to Eliot) FROM 5:00 PM - 8:30PM  - Nightfloat Team FROM 8:30 -7:30 AM    INTERVAL HPI/OVERNIGHT EVENTS:     No acute overnight events. Pt evaluated at bedside. Pt has no new complaints. Denies any complaints.      MEDICATIONS  (STANDING):  enoxaparin Injectable 40 milliGRAM(s) SubCutaneous every 24 hours  famotidine    Tablet 20 milliGRAM(s) Oral daily  ferrous    sulfate 325 milliGRAM(s) Oral daily  sertraline 100 milliGRAM(s) Oral daily  sodium chloride 0.9%. 1000 milliLiter(s) (100 mL/Hr) IV Continuous <Continuous>    MEDICATIONS  (PRN):      Vital Signs Last 24 Hrs  T(C): 36.7 (21 Apr 2025 07:34), Max: 36.8 (20 Apr 2025 13:45)  T(F): 98 (21 Apr 2025 07:34), Max: 98.2 (20 Apr 2025 13:45)  HR: 87 (21 Apr 2025 07:34) (36 - 90)  BP: 107/73 (21 Apr 2025 07:34) (80/49 - 119/74)  BP(mean): 69 (20 Apr 2025 15:45) (69 - 69)  RR: 18 (21 Apr 2025 07:34) (16 - 23)  SpO2: 98% (21 Apr 2025 07:34) (97% - 100%)    Parameters below as of 21 Apr 2025 07:34  Patient On (Oxygen Delivery Method): room air        PHYSICAL EXAMINATION:  GENERAL: NAD  HEAD:  Atraumatic, Normocephalic  EYES:  conjunctiva and sclera clear  NECK: Supple  CHEST/LUNG: Clear to auscultation  HEART: Regular rate and rhythm; No murmurs, rubs, or gallops  ABDOMEN: Soft, Nontender, Bowel sounds present, no masses on palpation  NERVOUS SYSTEM:  Alert and oriented x 3  EXTREMITIES:  No BLE edema  SKIN: warm, dry                          9.7    6.62  )-----------( 210      ( 21 Apr 2025 07:29 )             29.2     04-21    141  |  110[H]  |  9   ----------------------------<  98  3.8   |  25  |  0.52    Ca    8.6      21 Apr 2025 07:29  Phos  3.2     04-21  Mg     2.0     04-21    TPro  5.9[L]  /  Alb  2.7[L]  /  TBili  0.3  /  DBili  x   /  AST  18  /  ALT  12  /  AlkPhos  72  04-21    LIVER FUNCTIONS - ( 21 Apr 2025 07:29 )  Alb: 2.7 g/dL / Pro: 5.9 g/dL / ALK PHOS: 72 U/L / ALT: 12 U/L DA / AST: 18 U/L / GGT: x                   I&O's Summary    21 Apr 2025 07:01  -  21 Apr 2025 10:27  --------------------------------------------------------  IN: 200 mL / OUT: 0 mL / NET: 200 mL

## 2025-04-21 NOTE — H&P ADULT - PROBLEM SELECTOR PLAN 4
h/o anxiety/depression on zoloft 150mg qd per pt  surescripts zoloft 100mg qd  confirm dosing in the AM, and resume home meds as needed

## 2025-04-21 NOTE — H&P ADULT - HISTORY OF PRESENT ILLNESS
Patient is a  35 year-old female from home, with PMH of Anxiety/Depression, ADHD, Uterine Fibroids causing menorrhagia, KYLIE, HLD (not on any PO meds) (scheduled for Uterine Artery embolization on 5/2),  who presented to the ED after feeling faint in the ICU when visiting her grandmother. Patient     Patient denies nausea, vomiting, headache, blurry vision, dizziness, chest pain, abdominal pain, constipation, diarrhea, leg swelling. Patient is a 35 year-old female from home, with PMH of Anxiety/Depression, ADHD, Uterine Fibroids causing menorrhagia (scheduled for Uterine Artery embolization on 5/2, KYLIE, HLD (not on any PO meds) )  who presented to the ED after feeling faint in the ICU while visiting her grandmother. Patient states earlier she felt "hot", like she had to "poop", and felt a room-spinning sensation. Patient then noted she did have a syncopal event in the ER and had LOC for about 2-3seconds and regained consciousness after elevating her legs. Patient also states that she's had a prior fainting episode 2 years ago during a concert where she LOC x2. Patient states that after her syncopal event in the ER she was acutely confused. Patient also notes poor appetite and hydration. Of note patient is currently menstruating x 2 d and states she is passing clots. Patient also states that since last August - mid February she was having constant nausea/vomiting episodes that was worked up outpatient by her GI and cardiac with Holter, NST, TTE (d/t associated tachycardia) with inconclusive results. Patient endorses prior chronic marijuana use but states she was already worked up for [facility]. Denies any jerk-like movements, urinary or fecal incontinence, palpitations, CP, RAMIREZ, or SOB.     Patient denies nausea, vomiting, headache, blurry vision,  chest pain, abdominal pain, constipation, diarrhea. Denies any head strike.

## 2025-04-21 NOTE — DISCHARGE NOTE NURSING/CASE MANAGEMENT/SOCIAL WORK - FINANCIAL ASSISTANCE
[facility] provides services at a reduced cost to those who are determined to be eligible through [facility]’s financial assistance program. Information regarding [facility]’s financial assistance program can be found by going to [email or web address] or by calling [phone].

## 2025-04-21 NOTE — H&P ADULT - PROBLEM SELECTOR PLAN 1
presented after episode of dizziness in the ICU, and syncope in the ED in the setting of stressful environment, with prodromal symptoms  in the ED hypotensive to SBP 80s, bradycardic episode to 36  EKG showing NSR 71 BPM, Trop negx1, CXR neg  DBP positive for orthostats on sitting to standing  s/p 3L IVF NS  most likely vasovagal syncope vs orthostatic hypotension  admit to telemetry  c/w IVF 100ml/hr  f/u rpt orthostatic vitals in the AM  f/u TTE  Consult Cardiology and Neurology in the AM

## 2025-04-21 NOTE — DISCHARGE NOTE PROVIDER - CARE PROVIDER_API CALL
Tere Mendez  [phone]  Phone: (   )    -  Fax: (   )    -  Follow Up Time:     Jenna Bartlett  Cardiology  [address] [place] [address]  Phone: [phone]  Fax: [phone]  Follow Up Time:

## 2025-04-21 NOTE — H&P ADULT - ATTENDING COMMENTS
35 year-old female from home, with PMH of Anxiety/Depression, ADHD, Uterine Fibroids causing menorrhagia (scheduled for Uterine Artery embolization on 5/2, KYLIE, HLD (not on any PO meds) )  who presented to the ED after feeling faint in the ICU while visiting her grandmother. Patient states earlier she felt "hot", like she had to "poop", and felt a room-spinning sensation. Patient then noted she did have a syncopal event in the ER and had LOC for about 2-3seconds and regained consciousness after elevating her legs. Patient also states that she's had a prior fainting episode 2 years ago during a concert where she LOC x2. Patient states that after her syncopal event in the ER she was acutely confused. Patient also notes poor appetite and hydration. Of note patient is currently menstruating x 2 d and states she is passing clots. Patient also states that since last August - mid February she was having constant nausea/vomiting episodes that was worked up outpatient by her GI and cardiac with Holter, NST, TTE (d/t associated tachycardia) with inconclusive results. Patient endorses prior chronic marijuana use but states she was already worked up for [facility]. Denies any jerk-like movements, urinary or fecal incontinence, palpitations, CP, RAMIREZ, or SOB.     Patient denies nausea, vomiting, headache, blurry vision,  chest pain, abdominal pain, constipation, diarrhea. Denies any head strike.          assessment   --- syncope likely vasovagal, r/o acute cns patho, r/o arrythmia, h/o Anxiety/Depression, ADHD, Uterine Fibroids causing menorrhagia (scheduled for Uterine Artery embolization on 5/2, KYLIE, HLD    plan  --  adm to tele, aspirin, statin, cont preadmit home meds, gi and dvt prophylaxis, ivf   cbc, bmp, mg, phos, lipid, tsh, ce q8 x3    orthostatic bp q shift     echo      cardio cons  neuro cons.

## 2025-04-21 NOTE — DISCHARGE NOTE PROVIDER - NSDCMRMEDTOKEN_GEN_ALL_CORE_FT
atomoxetine 100 mg oral capsule: 1 cap(s) orally once a day  ferrous sulfate 325 mg (65 mg elemental iron) oral tablet: 1 tab(s) orally once a day  Pepcid 20 mg oral tablet: 1 tab(s) orally once a day  sertraline 100 mg oral tablet: 1.5 tab(s) orally once a day  spironolactone 100 mg oral tablet: 1 tab(s) orally once a day

## 2025-04-21 NOTE — PROGRESS NOTE ADULT - PROBLEM SELECTOR PLAN 4
h/o anxiety/depression on zoloft 150mg qd per pt  surescripts zoloft 100mg qd  confirm dosing in the AM, and resume home meds as needed h/o anxiety/depression on zoloft 150mg qd per pt  continue home med

## 2025-04-21 NOTE — DISCHARGE NOTE PROVIDER - NSFOLLOWUPCLINICS_GEN_ALL_ED_FT
[facility]  Neurology  [address] [place] [address]  Phone: [phone]  Fax: [phone]

## 2025-04-21 NOTE — PROGRESS NOTE ADULT - ASSESSMENT
Patient is a 35 year-old female from home, with PMH of Anxiety/Depression, ADHD, Uterine Fibroids, KYLIE, HLD,  who presented to the ED after feeling faint in the ICU while visiting her grandmother. Patient states earlier she felt "hot", like she had to "poop", and felt a room-spinning sensation. Patient then noted to have a syncopal event in the ER. Patient to be admitted for syncope.    PRIMARY TEAM TO CONFIRM MED REC WITH PATIENT'S PHARMACY IN AM Patient is a 35 year-old female from home, with PMH of Anxiety/Depression, ADHD, Uterine Fibroids, KYLIE, HLD,  who presented to the ED after feeling faint in the ICU while visiting her grandmother. Patient states earlier she felt "hot", like she had to "poop", and felt a room-spinning sensation. Patient then noted to have a syncopal event in the ER. Patient to be admitted for syncope.

## 2025-04-21 NOTE — DISCHARGE NOTE PROVIDER - HOSPITAL COURSE
Patient is a 35 year-old female from home, with PMH of Anxiety/Depression, ADHD, Uterine Fibroids causing menorrhagia (scheduled for Uterine Artery embolization on 5/2, KYLIE, HLD (not on any PO meds) )  who presented to the ED after feeling faint in the ICU while visiting her grandmother. Patient states earlier she felt "hot", like she had to "poop", and felt a room-spinning sensation. Patient then noted she did have a syncopal event in the ER and had LOC for about 2-3seconds and regained consciousness after elevating her legs. Patient also states that she's had a prior fainting episode 2 years ago during a concert where she LOC x2. Patient states that after her syncopal event in the ER she was acutely confused. Patient also notes poor appetite and hydration. Of note patient is currently menstruating x 2 d and states she is passing clots. Patient also states that since last August - mid February she was having constant nausea/vomiting episodes that was worked up outpatient by her GI and cardiac with Holter, NST, TTE (d/t associated tachycardia) with inconclusive results. Patient endorses prior chronic marijuana use but states she was already worked up for [facility]. Denies any jerk-like movements, urinary or fecal incontinence, palpitations, CP, RAMIREZ, or SOB. Patient denies nausea, vomiting, headache, blurry vision,  chest pain, abdominal pain, constipation, diarrhea. Denies any head strike. Pt was admitted for syncope.    ·  Problem: Syncope.   In the ED hypotensive to SBP 80s, bradycardic episode to 36. EKG showing NSR 71 BPM, Trop negx1, CXR neg. DBP positive for orthostats on sitting to standing. Pt was given 3L IVF NS and standing fluids. Repeat orthostatic negative. TTE showed _____. Neurology consulted; CTH not required. Syncope episode attributed to multifactorial including vasovagal, mild anemia, and bradycardia with hypotension.    ·  Problem: Uterine fibroid.   History uterine fibroids with menorrhagia due for uterine embolization on 5/2. Hgb 9.4 on admission.    ·  Problem: Iron deficiency anemia.   Continued home med iron tablet.    ·  Problem: Anxiety with depression.   Continued home med sertraline.    ·  Problem: ADHD.   Home med atomoxetine 100mg qd Patient is a 35 year-old female from home, with PMH of Anxiety/Depression, ADHD, Uterine Fibroids causing menorrhagia (scheduled for Uterine Artery embolization on 5/2, KYLIE, HLD (not on any PO meds) )  who presented to the ED after feeling faint in the ICU while visiting her grandmother. Patient states earlier she felt "hot", like she had to "poop", and felt a room-spinning sensation. Patient then noted she did have a syncopal event in the ER and had LOC for about 2-3seconds and regained consciousness after elevating her legs. Patient also states that she's had a prior fainting episode 2 years ago during a concert where she LOC x2. Patient states that after her syncopal event in the ER she was acutely confused. Patient also notes poor appetite and hydration. Of note patient is currently menstruating x 2 d and states she is passing clots. Patient also states that since last August - mid February she was having constant nausea/vomiting episodes that was worked up outpatient by her GI and cardiac with Holter, NST, TTE (d/t associated tachycardia) with inconclusive results. Patient endorses prior chronic marijuana use but states she was already worked up for [facility]. Denies any jerk-like movements, urinary or fecal incontinence, palpitations, CP, RAMIREZ, or SOB. Patient denies nausea, vomiting, headache, blurry vision,  chest pain, abdominal pain, constipation, diarrhea. Denies any head strike. Pt was admitted for syncope.    ·  Problem: Syncope.   In the ED hypotensive to SBP 80s, bradycardic episode to 36. EKG showing NSR 71 BPM, Trop negx1, CXR neg. DBP positive for orthostats on sitting to standing. Pt was given 3L IVF NS and standing fluids. Repeat orthostatic negative. TTE showed was performed. Neurology consulted; CTH not required. Syncope episode attributed to multifactorial including vasovagal, mild anemia, and bradycardia with hypotension.    ·  Problem: Uterine fibroid.   History uterine fibroids with menorrhagia due for uterine embolization on 5/2. Hgb 9.4 on admission.    ·  Problem: Iron deficiency anemia.   Continued home med iron tablet.    ·  Problem: Anxiety with depression.   Continued home med sertraline.    ·  Problem: ADHD.   Home med atomoxetine 100mg qd    After long discussion with patient and family, pt requested to leave hospital against medical advice. Pt has full capacity. Pt fully understood the risks of mortality and morbidity from leaving against medical advice. Pt was explained her current medical condition, risks and consequences of leaving without finishing treatment and workup. Pt was instructed to return to ED if she do not feel well and that she was welcome to return for care any time. Pt verbalized understanding.

## 2025-04-21 NOTE — PATIENT PROFILE ADULT - FALL HARM RISK - RISK INTERVENTIONS
Assistance OOB with selected safe patient handling equipment/Communicate Fall Risk and Risk Factors to all staff, patient, and family/Reinforce activity limits and safety measures with patient and family/Visual Cue: Yellow wristband/Bed in lowest position, wheels locked, appropriate side rails in place/Call bell, personal items and telephone in reach/Instruct patient to call for assistance before getting out of bed or chair/Non-slip footwear when patient is out of bed/[place] to call system/Physically safe environment - no spills, clutter or unnecessary equipment/Purposeful Proactive Rounding/Room/bathroom lighting operational, light cord in reach

## 2025-04-22 LAB
A1C WITH ESTIMATED AVERAGE GLUCOSE RESULT: 5.2 % — SIGNIFICANT CHANGE UP (ref 4–5.6)
ESTIMATED AVERAGE GLUCOSE: 103 MG/DL — SIGNIFICANT CHANGE UP (ref 68–114)